# Patient Record
Sex: FEMALE | Race: WHITE | NOT HISPANIC OR LATINO
[De-identification: names, ages, dates, MRNs, and addresses within clinical notes are randomized per-mention and may not be internally consistent; named-entity substitution may affect disease eponyms.]

---

## 2024-01-19 ENCOUNTER — APPOINTMENT (OUTPATIENT)
Dept: CARDIOLOGY | Facility: CLINIC | Age: 64
End: 2024-01-19
Payer: COMMERCIAL

## 2024-01-19 VITALS — HEIGHT: 66 IN | BODY MASS INDEX: 31.5 KG/M2 | WEIGHT: 196 LBS

## 2024-01-19 PROCEDURE — 93000 ELECTROCARDIOGRAM COMPLETE: CPT

## 2024-01-19 PROCEDURE — 99204 OFFICE O/P NEW MOD 45 MIN: CPT

## 2024-01-19 PROCEDURE — ZZZZZ: CPT

## 2024-01-19 NOTE — PHYSICAL EXAM
[Normal Venous Pressure] : normal venous pressure [Normal S1, S2] : normal S1, S2 [Clear Lung Fields] : clear lung fields [Soft] : abdomen soft [No Edema] : no edema [de-identified] : RRR

## 2024-01-19 NOTE — HISTORY OF PRESENT ILLNESS
[FreeTextEntry1] : Pt with HLD, statin myopathy, pt had cold 2 to 3 weeks ago and patient comes in for cv evaluation. pt says at rest having palpiations other day and now not present. pt says was started atrovastatin for  and felt like heart gonna explode. 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  in august and improved once good.  pt did not have palpiations in awhile, pt says has been coming off and on, pt feels heart beating like strong but weaker.  pt with no dizziness or syncope with episodes and no cp or sob.

## 2024-01-19 NOTE — DISCUSSION/SUMMARY
[FreeTextEntry1] : get diet control for hld  get TSH  get monitor  get 2 d echo  f/u in 4 months  [EKG obtained to assist in diagnosis and management of assessed problem(s)] : EKG obtained to assist in diagnosis and management of assessed problem(s)

## 2024-04-08 ENCOUNTER — OFFICE VISIT (OUTPATIENT)
Dept: URGENT CARE | Facility: CLINIC | Age: 64
End: 2024-04-08
Payer: COMMERCIAL

## 2024-04-08 VITALS
BODY MASS INDEX: 32.18 KG/M2 | WEIGHT: 205 LBS | HEART RATE: 68 BPM | RESPIRATION RATE: 18 BRPM | TEMPERATURE: 97.8 F | DIASTOLIC BLOOD PRESSURE: 76 MMHG | OXYGEN SATURATION: 97 % | SYSTOLIC BLOOD PRESSURE: 118 MMHG | HEIGHT: 67 IN

## 2024-04-08 DIAGNOSIS — N76.0 ACUTE VAGINITIS: ICD-10-CM

## 2024-04-08 DIAGNOSIS — J02.9 ACUTE PHARYNGITIS, UNSPECIFIED ETIOLOGY: Primary | ICD-10-CM

## 2024-04-08 LAB — S PYO AG THROAT QL: NEGATIVE

## 2024-04-08 PROCEDURE — 87880 STREP A ASSAY W/OPTIC: CPT | Performed by: FAMILY MEDICINE

## 2024-04-08 PROCEDURE — 99203 OFFICE O/P NEW LOW 30 MIN: CPT | Performed by: FAMILY MEDICINE

## 2024-04-08 PROCEDURE — 87070 CULTURE OTHR SPECIMN AEROBIC: CPT | Performed by: FAMILY MEDICINE

## 2024-04-08 RX ORDER — ALPRAZOLAM 0.25 MG/1
0.25 TABLET ORAL EVERY 6 HOURS PRN
COMMUNITY
Start: 2024-03-07

## 2024-04-08 RX ORDER — OXYCODONE AND ACETAMINOPHEN 10; 325 MG/1; MG/1
TABLET ORAL
COMMUNITY
Start: 2024-03-15

## 2024-04-08 RX ORDER — FLUCONAZOLE 150 MG/1
TABLET ORAL
Qty: 1 TABLET | Refills: 0 | Status: SHIPPED | OUTPATIENT
Start: 2024-04-08 | End: 2024-04-11

## 2024-04-08 NOTE — PATIENT INSTRUCTIONS
1. Acute Pharyngitis  - rapid strep test in office is negative, throat swab sent for culture testing, patient has been instructed to call the office in 2 days to follow up culture results  - take Tylenol or Motrin as needed for pain/fever   - patient is to rest and drink plenty of fluids   - advised warm salt water gargles and throat lozenges as needed    - drink warm tea w/ lemon and honey   - may use Chloraseptic throat spray as needed   - follow up w/ PCP office for re-check in 3-5 days  - if symptoms persist despite treatment, worsen, or any new symptoms present, patient is to be seen in the ER.    2. Acute Vaginitis   - Fluconazole x 2 doses prescribed, complete as directed.   - follow up w/ PCP office for re-check in 3-5 days   - if symptoms persist despite treatment, worsen, or any new symptoms present, patient is to be seen in the ER or GYN office

## 2024-04-08 NOTE — PROGRESS NOTES
[facility]        NAME: Jossy Hagen is a 64 y.o. female  : 1960    MRN: [id number]  DATE: 2024  TIME: 5:24 PM    Assessment and Plan   Acute pharyngitis, unspecified etiology [J02.9]  1. Acute pharyngitis, unspecified etiology  POCT rapid ANTIGEN strepA    Throat culture      2. Acute vaginitis  fluconazole (DIFLUCAN) 150 mg tablet            Patient Instructions     Patient Instructions   1. Acute Pharyngitis  - rapid strep test in office is negative, throat swab sent for culture testing, patient has been instructed to call the office in 2 days to follow up culture results  - take Tylenol or Motrin as needed for pain/fever   - patient is to rest and drink plenty of fluids   - advised warm salt water gargles and throat lozenges as needed    - drink warm tea w/ lemon and honey   - may use Chloraseptic throat spray as needed   - follow up w/ PCP office for re-check in 3-5 days  - if symptoms persist despite treatment, worsen, or any new symptoms present, patient is to be seen in the ER.    2. Acute Vaginitis   - Fluconazole x 2 doses prescribed, complete as directed.   - follow up w/ PCP office for re-check in 3-5 days   - if symptoms persist despite treatment, worsen, or any new symptoms present, patient is to be seen in the ER or GYN office     Follow up with PCP in 3-5 days.  Proceed to  ER if symptoms worsen.    If tests have been performed at [facility], our office will contact you with results if changes need to be made to the care plan discussed with you at the visit.  You can review your full results on [facility] MyChart.    Chief Complaint     Chief Complaint   Patient presents with    Cold Like Symptoms     Pt states that she woke up tired with some post nasal drip and sore throat. Pt took motrin today. Pt also think she may have a yeast infection.         History of Present Illness       63 yo female presents c/o a slight sore throat and post-nasal drip that began earlier today.  No other URI symptoms. No fever/chills. No headache or body aches. Patient is a former smoker. No loss of taste or smell. No neck pain or swelling. No feelings of throat closing or difficulty swallowing. No drooling or muffled voice. She states both her grandchildren were diagnosed were strep throat this morning. She has taken Motrin for the symptoms. Rapid strep test performed in office today is negative. She has no known allergies.   She also states she is concerned she may have a yeast infection. She states she was recently on Augmentin for a dental infection, and finished the last dose one week ago. She is now experiencing vaginal burning, itching, and irritation for the past 2-3 days ago. She denies any vaginal bleeding or discharge. No UTI like symptoms. No hematuria. No abdominal or pelvic pain. No flank pain. No nausea/vomiting or diarrhea. She has not attempted any treatment for the symptoms.      Review of Systems   Review of Systems   Constitutional: Negative.    HENT:          As noted in HPI   Eyes: Negative.    Respiratory: Negative.     Cardiovascular: Negative.    Gastrointestinal: Negative.    Genitourinary:         As noted in HPI   Musculoskeletal: Negative.    Skin: Negative.    Allergic/Immunologic: Negative.    Neurological: Negative.    Hematological: Negative.          Current Medications       Current Outpatient Medications:     ALPRAZolam (XANAX) 0.25 mg tablet, Take 0.25 mg by mouth every 6 (six) hours as needed, Disp: , Rfl:     fluconazole (DIFLUCAN) 150 mg tablet, Take 1 tablet today, then take the second tablet in 3 days, Disp: 1 tablet, Rfl: 0    oxyCODONE-acetaminophen (PERCOCET)  mg per tablet, TAKE 1 TABLET BY MOUTH EVERY 6 HOURS AS NEEDED FOR 30 DAYS, Disp: , Rfl:     Current Allergies     Allergies as of 04/08/2024    (No Known Allergies)            The following portions of the patient's history were reviewed and updated as appropriate: allergies, current medications, past  "family history, past medical history, past social history, past surgical history and problem list.     Past Medical History:   Diagnosis Date    Anxiety        Past Surgical History:   Procedure Laterality Date    BILATERAL HIP ARTHROSCOPY Bilateral 2014    ROOT CANAL         History reviewed. No pertinent family history.      Medications have been verified.        Objective   /76   Pulse 68   Temp 97.8 °F (36.6 °C)   Resp 18   Ht 5' 7\" (1.702 m)   Wt 93 kg (205 lb)   SpO2 97%   BMI 32.11 kg/m²   No LMP recorded.       Physical Exam     Physical Exam  Vitals and nursing note reviewed.   Constitutional:       General: She is awake. She is not in acute distress.     Appearance: Normal appearance. She is well-developed and well-groomed. She is not ill-appearing, toxic-appearing or diaphoretic.   HENT:      Head: Normocephalic and atraumatic.      Jaw: There is normal jaw occlusion.      Right Ear: Tympanic membrane, ear canal and external ear normal.      Left Ear: Tympanic membrane, ear canal and external ear normal.      Nose: Nose normal.      Mouth/Throat:      Lips: Pink. No lesions.      Mouth: Mucous membranes are moist.      Pharynx: Oropharynx is clear. Uvula midline.   Eyes:      General: Lids are normal.      Conjunctiva/sclera: Conjunctivae normal.   Neck:      Trachea: Trachea and phonation normal.   Cardiovascular:      Rate and Rhythm: Normal rate.      Pulses: Normal pulses.   Pulmonary:      Effort: Pulmonary effort is normal. No tachypnea, accessory muscle usage or respiratory distress.   Musculoskeletal:      Cervical back: Neck supple. No edema, erythema, rigidity or tenderness.   Lymphadenopathy:      Cervical: No cervical adenopathy.   Skin:     General: Skin is warm and dry.      Capillary Refill: Capillary refill takes less than 2 seconds.      Coloration: Skin is not pale.   Neurological:      Mental Status: She is alert and oriented to person, place, and time. Mental status is at " baseline.   Psychiatric:         Mood and Affect: Mood normal.         Behavior: Behavior normal. Behavior is cooperative.         Thought Content: Thought content normal.         Judgment: Judgment normal.

## 2024-04-10 LAB — BACTERIA THROAT CULT: NORMAL

## 2024-05-10 ENCOUNTER — APPOINTMENT (OUTPATIENT)
Dept: CARDIOLOGY | Facility: CLINIC | Age: 64
End: 2024-05-10

## 2024-06-06 ENCOUNTER — APPOINTMENT (OUTPATIENT)
Dept: CARDIOLOGY | Facility: CLINIC | Age: 64
End: 2024-06-06
Payer: COMMERCIAL

## 2024-06-06 VITALS — SYSTOLIC BLOOD PRESSURE: 130 MMHG | DIASTOLIC BLOOD PRESSURE: 80 MMHG

## 2024-06-06 DIAGNOSIS — E78.2 MIXED HYPERLIPIDEMIA: ICD-10-CM

## 2024-06-06 DIAGNOSIS — G72.0 DRUG-INDUCED MYOPATHY: ICD-10-CM

## 2024-06-06 DIAGNOSIS — T46.6X5A DRUG-INDUCED MYOPATHY: ICD-10-CM

## 2024-06-06 DIAGNOSIS — R00.2 PALPITATIONS: ICD-10-CM

## 2024-06-06 DIAGNOSIS — Z00.00 ENCOUNTER FOR GENERAL ADULT MEDICAL EXAMINATION W/OUT ABNORMAL FINDINGS: ICD-10-CM

## 2024-06-06 PROCEDURE — 99214 OFFICE O/P EST MOD 30 MIN: CPT

## 2024-06-06 PROCEDURE — G2211 COMPLEX E/M VISIT ADD ON: CPT | Mod: NC,1L

## 2024-06-06 RX ORDER — METOPROLOL SUCCINATE 50 MG/1
50 TABLET, EXTENDED RELEASE ORAL
Qty: 90 | Refills: 3 | Status: ACTIVE | COMMUNITY
Start: 2024-06-06 | End: 1900-01-01

## 2024-06-24 ENCOUNTER — APPOINTMENT (OUTPATIENT)
Dept: CARDIOLOGY | Facility: CLINIC | Age: 64
End: 2024-06-24

## 2024-06-24 PROCEDURE — 93306 TTE W/DOPPLER COMPLETE: CPT

## 2024-06-28 NOTE — DISCUSSION/SUMMARY
[FreeTextEntry1] : get diet control for hld  PSVT: ? AT start metoprolol er 50 mg po qd  send to dr. pimentel for evaluation  get bloodwork  2 d echo  f/u 4 months

## 2024-06-28 NOTE — PHYSICAL EXAM
[Normal Venous Pressure] : normal venous pressure [Normal S1, S2] : normal S1, S2 [Clear Lung Fields] : clear lung fields [Soft] : abdomen soft [No Edema] : no edema [de-identified] : RRR

## 2024-06-28 NOTE — HISTORY OF PRESENT ILLNESS
[FreeTextEntry1] : Pt with HLD, statin myopathy, pt had cold 2 to 3 weeks ago SVT  pt says at rest having palpiations other day and now not present. pt says was started atorvastatin for  and felt like her  heart gonna explode. 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   pt did not have palpitations in awhile, pt says has been intemittent in nature, pt feels heart beating like strong but weaker at points.  pt denies dizziness or syncope with episodes and no cp or sob.  6/6/24: Echo canceled.  MCOT:  bpm avg 63 bpm pvc's 5% and pac's 4%, 53 pt trigger with heart racing at 140 to 177 bpm.  pt needs echo did not show up for visit, pt to start metoprolol er 50 mg po qd   6/28/24:  lvef: 59%, DD2, mod LAE, mild TR, e' sept: 0.10 m/s, LVOT VTI: 16.5 cm, GLS: -20% made appointment  pt to take metoprolol ER - pt advised to start it was afraid.

## 2024-07-24 ENCOUNTER — APPOINTMENT (OUTPATIENT)
Dept: ELECTROPHYSIOLOGY | Facility: CLINIC | Age: 64
End: 2024-07-24
Payer: COMMERCIAL

## 2024-07-24 VITALS
BODY MASS INDEX: 32.95 KG/M2 | SYSTOLIC BLOOD PRESSURE: 115 MMHG | HEART RATE: 47 BPM | HEIGHT: 66 IN | DIASTOLIC BLOOD PRESSURE: 80 MMHG | WEIGHT: 205 LBS

## 2024-07-24 PROCEDURE — 93000 ELECTROCARDIOGRAM COMPLETE: CPT

## 2024-07-24 PROCEDURE — 99205 OFFICE O/P NEW HI 60 MIN: CPT

## 2024-07-24 RX ORDER — OXYCODONE HYDROCHLORIDE AND ACETAMINOPHEN 10; 325 MG/1; MG/1
TABLET ORAL
Refills: 0 | Status: ACTIVE | COMMUNITY

## 2024-07-24 NOTE — ASSESSMENT
[FreeTextEntry1] : ## SVT ## PVCs ## Sinus Bradycardia   - MCOT from January reviewed. Episodes consistent with SVT, likely AVNRT.  - Discussed different management option including clinical observation +/- medication and ablation. After detailed discussion, patient opted for conservative management. Patient will contact us if more episodes or changes the mind. - Continue with Metoprolol.  - PVC symptoms improved with Metoprolol.    - RTC as needed.

## 2024-07-24 NOTE — ADDENDUM
[FreeTextEntry1] : Silvia SWANSON assisted in documentation on 07/24/2024 acting as a scribe for Dr. Pratima Combs.

## 2024-07-24 NOTE — HISTORY OF PRESENT ILLNESS
[FreeTextEntry1] : Ms. Garcia is a 64-year-old female with PMHx of SVT, DLD, statin myopathy, is here for management of palpitations.    She used to work with Dr. Martinez. Now she works as a  in [employer].   She has had palpitations since she was a teenager. Worse in the past few years due to multiple deaths in the family.    Stated on Metoprolol and feels much better.   She had two kinds of palpitations. One kind is racing of the heart and the second is skipping or strong beat.    Denies chest pain, shortness of breath, palpitation, dizziness or LOC except noted above.  EKG (07/24/2024): SR @ 47, , QRS 82, QTc 400 Echo (06/2024): Normal EF, moderate LAE.  Cardio: Dr. Barksdale

## 2024-07-25 ENCOUNTER — APPOINTMENT (OUTPATIENT)
Dept: NEUROLOGY | Facility: CLINIC | Age: 64
End: 2024-07-25

## 2024-10-03 ENCOUNTER — APPOINTMENT (OUTPATIENT)
Dept: CARDIOLOGY | Facility: CLINIC | Age: 64
End: 2024-10-03

## 2025-06-30 ENCOUNTER — APPOINTMENT (OUTPATIENT)
Dept: CARDIOLOGY | Facility: CLINIC | Age: 65
End: 2025-06-30
Payer: COMMERCIAL

## 2025-06-30 ENCOUNTER — NON-APPOINTMENT (OUTPATIENT)
Age: 65
End: 2025-06-30

## 2025-07-01 ENCOUNTER — APPOINTMENT (OUTPATIENT)
Dept: CARDIOLOGY | Facility: CLINIC | Age: 65
End: 2025-07-01
Payer: COMMERCIAL

## 2025-07-01 PROCEDURE — 99214 OFFICE O/P EST MOD 30 MIN: CPT | Mod: 93

## 2025-07-01 PROCEDURE — G2211 COMPLEX E/M VISIT ADD ON: CPT | Mod: NC,93

## 2025-08-04 ENCOUNTER — APPOINTMENT (OUTPATIENT)
Dept: CARDIOLOGY | Facility: CLINIC | Age: 65
End: 2025-08-04
Payer: COMMERCIAL

## 2025-08-04 PROCEDURE — 93306 TTE W/DOPPLER COMPLETE: CPT

## 2025-08-04 PROCEDURE — 93356 MYOCRD STRAIN IMG SPCKL TRCK: CPT

## 2025-08-05 PROBLEM — I51.7 LEFT ATRIAL ENLARGEMENT: Status: ACTIVE | Noted: 2025-06-30

## 2025-08-05 PROBLEM — I50.32 CHRONIC DIASTOLIC HEART FAILURE: Status: ACTIVE | Noted: 2025-06-30

## 2025-08-05 PROBLEM — I47.19 AVNRT (AV NODAL RE-ENTRY TACHYCARDIA): Status: ACTIVE | Noted: 2025-06-30

## 2025-08-07 ENCOUNTER — APPOINTMENT (OUTPATIENT)
Dept: CARDIOLOGY | Facility: CLINIC | Age: 65
End: 2025-08-07

## 2025-08-07 DIAGNOSIS — T46.6X5A DRUG-INDUCED MYOPATHY: ICD-10-CM

## 2025-08-07 DIAGNOSIS — R00.2 PALPITATIONS: ICD-10-CM

## 2025-08-07 DIAGNOSIS — I50.32 CHRONIC DIASTOLIC (CONGESTIVE) HEART FAILURE: ICD-10-CM

## 2025-08-07 DIAGNOSIS — G72.0 DRUG-INDUCED MYOPATHY: ICD-10-CM

## 2025-08-07 DIAGNOSIS — I47.19 OTHER SUPRAVENTRICULAR TACHYCARDIA: ICD-10-CM

## 2025-08-07 DIAGNOSIS — E78.2 MIXED HYPERLIPIDEMIA: ICD-10-CM

## 2025-08-07 DIAGNOSIS — I51.7 CARDIOMEGALY: ICD-10-CM

## 2025-08-07 PROCEDURE — G2211 COMPLEX E/M VISIT ADD ON: CPT | Mod: NC,93

## 2025-08-07 PROCEDURE — 99214 OFFICE O/P EST MOD 30 MIN: CPT | Mod: 93
